# Patient Record
Sex: MALE | Race: BLACK OR AFRICAN AMERICAN | NOT HISPANIC OR LATINO | Employment: OTHER | URBAN - METROPOLITAN AREA
[De-identification: names, ages, dates, MRNs, and addresses within clinical notes are randomized per-mention and may not be internally consistent; named-entity substitution may affect disease eponyms.]

---

## 2017-02-27 NOTE — PATIENT DISCUSSION
PROLIFERATIVE DIABETIC RETINOPATHY WITH DENSE VITREOUS HEMORRHAGE, OD:  AVASTIN (1.25MG) INTRAVITREAL INJECTION. RETURN AS SCHEDULED.

## 2017-02-27 NOTE — PATIENT DISCUSSION
PROLIFERATIVE DIABETIC RETINOPATHY OS: NO TREATMENT IS NEEDED TODAY. WILL CONTINUE TO FOLLOW WITH DILATED EXAMS AS SCHEDULED.

## 2017-03-23 NOTE — PATIENT DISCUSSION
PROLIFERATIVE DIABETIC RETINOPATHY WITH DENSE VITREOUS HEMORRHAGE, OD: BEGINNING TO CLEAR. MONITOR CLOSELY FOR CHANGE WITH DILATED EXAMS.

## 2017-04-06 NOTE — PATIENT DISCUSSION
POST OP OD: PT DOING WELL TODAY. RETINA IS ATTACHED. IOP IS WITHIN ACCEPTABLE LIMITS. VISION SHOULD START TO IMPROVE. IF ANY WORSENING OF VISION OR PAIN, PT IS TO CALL IMMEDIATELY. RETINAL DETACHMENT AND ENDOPHTHALMITIS PRECAUTIONS REVIEWED. INSTRUCTED PATIENT TO WEAR EYE SHIELD WHEN SLEEPING FOR THE FIRST WEEK AFTER SURGERY.

## 2017-06-14 NOTE — PATIENT DISCUSSION
PROLIFERATIVE DIABETIC RETINOPATHY OU:  NO TREATMENT IS NEEDED TODAY. WILL CONTINUE TO FOLLOW WITH DILATED EXAMS AS SCHEDULED.

## 2019-02-11 ENCOUNTER — IMPORTED ENCOUNTER (OUTPATIENT)
Dept: URBAN - METROPOLITAN AREA CLINIC 9 | Facility: CLINIC | Age: 69
End: 2019-02-11

## 2019-08-12 ENCOUNTER — IMPORTED ENCOUNTER (OUTPATIENT)
Dept: URBAN - METROPOLITAN AREA CLINIC 9 | Facility: CLINIC | Age: 69
End: 2019-08-12

## 2020-05-12 ENCOUNTER — IMPORTED ENCOUNTER (OUTPATIENT)
Dept: URBAN - METROPOLITAN AREA CLINIC 9 | Facility: CLINIC | Age: 70
End: 2020-05-12

## 2020-09-09 ENCOUNTER — IMPORTED ENCOUNTER (OUTPATIENT)
Dept: URBAN - METROPOLITAN AREA CLINIC 9 | Facility: CLINIC | Age: 70
End: 2020-09-09

## 2020-09-14 ENCOUNTER — IMPORTED ENCOUNTER (OUTPATIENT)
Dept: URBAN - METROPOLITAN AREA CLINIC 9 | Facility: CLINIC | Age: 70
End: 2020-09-14

## 2020-09-14 PROBLEM — H25.13: Noted: 2020-09-14

## 2020-09-14 PROBLEM — H25.12: Noted: 2020-09-14

## 2021-10-16 ASSESSMENT — VISUAL ACUITY
OS_SC: 20/20 -3 SN
OS_SC: 20/20 SN
OD_PH: 20/30 -2 SN
OD_CC: 20/30 +2 SN
OS_CC: 20/20 -2 SN
OD_SC: 20/30 +2 SN
OD_SC: 20/25 - SN
OD_SC: 20/25 -2 SN
OS_SC: 20/25 - SN
OS_CC: 20/20 - SN
OD_CC: 20/40 SN

## 2021-10-16 ASSESSMENT — TONOMETRY
OS_IOP_MMHG: 19
OD_IOP_MMHG: 17
OS_IOP_MMHG: 19
OS_IOP_MMHG: 16
OD_IOP_MMHG: 17
OD_IOP_MMHG: 15
OS_IOP_MMHG: 16
OD_IOP_MMHG: 14

## 2021-11-03 ENCOUNTER — PREPPED CHART (OUTPATIENT)
Dept: URBAN - METROPOLITAN AREA CLINIC 6 | Facility: CLINIC | Age: 71
End: 2021-11-03

## 2021-11-04 ENCOUNTER — ESTABLISHED COMPREHENSIVE EXAM (OUTPATIENT)
Dept: URBAN - METROPOLITAN AREA CLINIC 6 | Facility: CLINIC | Age: 71
End: 2021-11-04

## 2021-11-04 ENCOUNTER — PREPPED CHART (OUTPATIENT)
Dept: URBAN - METROPOLITAN AREA CLINIC 6 | Facility: CLINIC | Age: 71
End: 2021-11-04

## 2021-11-04 DIAGNOSIS — E11.9: ICD-10-CM

## 2021-11-04 DIAGNOSIS — H40.1132: ICD-10-CM

## 2021-11-04 DIAGNOSIS — H25.13: ICD-10-CM

## 2021-11-04 PROCEDURE — 92014 COMPRE OPH EXAM EST PT 1/>: CPT

## 2021-11-04 PROCEDURE — 92133 CPTRZD OPH DX IMG PST SGM ON: CPT

## 2021-11-04 ASSESSMENT — VISUAL ACUITY
OU_CC: J2
OD_CC: 20/40-2
OS_CC: 20/40-2

## 2021-11-05 ASSESSMENT — TONOMETRY
OD_IOP_MMHG: 18
OS_IOP_MMHG: 21

## 2021-12-20 ENCOUNTER — ESTABLISHED PATIENT (OUTPATIENT)
Dept: URBAN - METROPOLITAN AREA CLINIC 6 | Facility: CLINIC | Age: 71
End: 2021-12-20

## 2021-12-20 DIAGNOSIS — H40.1132: ICD-10-CM

## 2021-12-20 DIAGNOSIS — H25.13: ICD-10-CM

## 2021-12-20 PROCEDURE — 92015 DETERMINE REFRACTIVE STATE: CPT

## 2021-12-20 PROCEDURE — 92012 INTRM OPH EXAM EST PATIENT: CPT

## 2021-12-20 ASSESSMENT — VISUAL ACUITY
OD_CC: 20/40+1
OS_CC: 20/25-1

## 2021-12-20 ASSESSMENT — TONOMETRY
OD_IOP_MMHG: 15
OS_IOP_MMHG: 20

## 2022-02-23 ENCOUNTER — DIAGNOSTICS ONLY (OUTPATIENT)
Dept: URBAN - METROPOLITAN AREA CLINIC 6 | Facility: CLINIC | Age: 72
End: 2022-02-23

## 2022-02-23 DIAGNOSIS — H40.1132: ICD-10-CM

## 2022-02-23 PROCEDURE — 92083 EXTENDED VISUAL FIELD XM: CPT

## 2022-04-21 ENCOUNTER — FOLLOW UP (OUTPATIENT)
Dept: URBAN - METROPOLITAN AREA CLINIC 6 | Facility: CLINIC | Age: 72
End: 2022-04-21

## 2022-04-21 DIAGNOSIS — H40.1132: ICD-10-CM

## 2022-04-21 DIAGNOSIS — H25.13: ICD-10-CM

## 2022-04-21 DIAGNOSIS — Z98.890: ICD-10-CM

## 2022-04-21 PROCEDURE — 92012 INTRM OPH EXAM EST PATIENT: CPT

## 2022-04-21 ASSESSMENT — VISUAL ACUITY
OU_SC: 20/20-2
OS_SC: 20/20-2
OD_SC: 20/40+2
OU_SC: J4

## 2022-04-21 ASSESSMENT — TONOMETRY
OD_IOP_MMHG: 20
OS_IOP_MMHG: 19

## 2022-06-20 RX ORDER — BIMATOPROST 0.01 %
DROPS OPHTHALMIC (EYE)
COMMUNITY

## 2022-06-20 RX ORDER — AZITHROMYCIN 250 MG/1
TABLET, FILM COATED ORAL
COMMUNITY
End: 2022-07-19 | Stop reason: ALTCHOICE

## 2022-06-20 RX ORDER — CHLORTHALIDONE 25 MG/1
TABLET ORAL
COMMUNITY

## 2022-06-20 RX ORDER — BRIMONIDINE TARTRATE, TIMOLOL MALEATE 2; 5 MG/ML; MG/ML
SOLUTION/ DROPS OPHTHALMIC
COMMUNITY
End: 2022-07-19 | Stop reason: ALTCHOICE

## 2022-06-20 RX ORDER — ALBUTEROL SULFATE 90 UG/1
AEROSOL, METERED RESPIRATORY (INHALATION)
COMMUNITY
End: 2022-09-28 | Stop reason: CLARIF

## 2022-06-20 RX ORDER — DILTIAZEM HYDROCHLORIDE 240 MG/1
1 CAPSULE, EXTENDED RELEASE ORAL
COMMUNITY
End: 2022-07-19 | Stop reason: ALTCHOICE

## 2022-06-20 RX ORDER — CLONIDINE HYDROCHLORIDE 0.1 MG/1
0.3 TABLET ORAL 2 TIMES DAILY
COMMUNITY

## 2022-06-28 RX ORDER — OXCARBAZEPINE 600 MG/1
TABLET, FILM COATED ORAL
COMMUNITY

## 2022-06-28 RX ORDER — SILDENAFIL 100 MG/1
TABLET, FILM COATED ORAL
COMMUNITY

## 2022-06-28 RX ORDER — TRAVOPROST OPHTHALMIC SOLUTION 0.04 MG/ML
SOLUTION OPHTHALMIC
COMMUNITY
End: 2022-07-19 | Stop reason: ALTCHOICE

## 2022-06-28 RX ORDER — DIVALPROEX SODIUM 500 MG/1
TABLET, EXTENDED RELEASE ORAL
COMMUNITY

## 2022-06-28 RX ORDER — TADALAFIL 20 MG/1
TABLET ORAL
COMMUNITY
End: 2022-07-19 | Stop reason: ALTCHOICE

## 2022-06-28 RX ORDER — DONEPEZIL HYDROCHLORIDE 5 MG/1
TABLET, FILM COATED ORAL
COMMUNITY

## 2022-06-28 RX ORDER — VALSARTAN 320 MG/1
TABLET ORAL
COMMUNITY

## 2022-06-28 RX ORDER — SITAGLIPTIN AND METFORMIN HYDROCHLORIDE 500; 50 MG/1; MG/1
TABLET, FILM COATED ORAL
COMMUNITY

## 2022-07-19 PROBLEM — M25.519 SHOULDER JOINT PAIN: Status: ACTIVE | Noted: 2022-07-19

## 2022-07-19 PROBLEM — M75.21 RIGHT BICIPITAL TENOSYNOVITIS: Status: ACTIVE | Noted: 2022-07-19

## 2022-07-19 PROBLEM — R63.4 WEIGHT LOSS: Status: ACTIVE | Noted: 2022-07-19

## 2022-07-19 PROBLEM — E83.52 HYPERCALCEMIA: Status: ACTIVE | Noted: 2022-07-19

## 2022-07-19 PROBLEM — E78.2 MIXED HYPERLIPIDEMIA: Status: ACTIVE | Noted: 2022-07-19

## 2022-07-19 PROBLEM — E55.9 VITAMIN D DEFICIENCY: Status: ACTIVE | Noted: 2022-07-19

## 2022-07-19 PROBLEM — J40 BRONCHITIS: Status: ACTIVE | Noted: 2022-07-19

## 2022-07-19 PROBLEM — R35.1 NOCTURIA: Status: ACTIVE | Noted: 2022-07-19

## 2022-07-19 PROBLEM — R61 NIGHT SWEATS: Status: ACTIVE | Noted: 2022-07-19

## 2022-07-19 PROBLEM — J18.9 WALKING PNEUMONIA: Status: ACTIVE | Noted: 2022-07-19

## 2022-07-19 PROBLEM — M81.0 OSTEOPOROSIS: Status: ACTIVE | Noted: 2022-07-19

## 2022-07-19 PROBLEM — R07.81 RIB PAIN ON LEFT SIDE: Status: ACTIVE | Noted: 2022-07-19

## 2022-07-19 PROBLEM — M67.911 DYSFUNCTION OF RIGHT ROTATOR CUFF: Status: ACTIVE | Noted: 2022-07-19

## 2022-07-19 PROBLEM — I10 ESSENTIAL HYPERTENSION: Status: ACTIVE | Noted: 2022-07-19

## 2022-07-19 PROBLEM — N40.1 BENIGN PROSTATIC HYPERPLASIA WITH LOWER URINARY TRACT SYMPTOMS: Status: ACTIVE | Noted: 2022-07-19

## 2022-07-19 PROBLEM — M25.559 HIP PAIN: Status: ACTIVE | Noted: 2022-07-19

## 2022-07-19 PROBLEM — N52.9 ERECTILE DYSFUNCTION: Status: ACTIVE | Noted: 2022-07-19

## 2022-07-19 PROBLEM — M54.2 CERVICALGIA: Status: ACTIVE | Noted: 2022-07-19

## 2022-07-19 PROBLEM — J32.9 SINUSITIS: Status: ACTIVE | Noted: 2022-07-19

## 2022-07-19 PROBLEM — G40.909 SEIZURE DISORDER (HCC): Status: ACTIVE | Noted: 2022-07-19

## 2022-07-19 PROBLEM — R97.20 ELEVATED PSA: Status: ACTIVE | Noted: 2022-07-19

## 2022-07-19 PROBLEM — E11.9 TYPE 2 DIABETES MELLITUS (HCC): Status: ACTIVE | Noted: 2022-07-19

## 2022-07-19 PROBLEM — M54.9 BACKACHE: Status: ACTIVE | Noted: 2022-07-19

## 2022-07-19 PROBLEM — R06.02 SHORTNESS OF BREATH: Status: ACTIVE | Noted: 2022-07-19

## 2022-07-19 PROBLEM — M19.011 ARTHROSIS OF RIGHT ACROMIOCLAVICULAR JOINT: Status: ACTIVE | Noted: 2022-07-19

## 2022-07-19 PROBLEM — N40.0 BPH (BENIGN PROSTATIC HYPERPLASIA): Status: ACTIVE | Noted: 2022-07-19

## 2022-07-19 PROBLEM — C61 PROSTATE CANCER (HCC): Status: ACTIVE | Noted: 2022-07-19

## 2022-07-19 PROBLEM — R07.89 ATYPICAL CHEST PAIN: Status: ACTIVE | Noted: 2022-07-19

## 2022-07-19 PROBLEM — R93.89 ABNORMAL CT SCAN: Status: ACTIVE | Noted: 2022-07-19

## 2022-08-18 PROBLEM — J32.9 SINUSITIS: Status: RESOLVED | Noted: 2022-07-19 | Resolved: 2022-08-18

## 2022-08-22 ENCOUNTER — ESTABLISHED PATIENT (OUTPATIENT)
Dept: URBAN - METROPOLITAN AREA CLINIC 6 | Facility: CLINIC | Age: 72
End: 2022-08-22

## 2022-08-22 DIAGNOSIS — H25.13: ICD-10-CM

## 2022-08-22 DIAGNOSIS — H40.1132: ICD-10-CM

## 2022-08-22 DIAGNOSIS — Z98.890: ICD-10-CM

## 2022-08-22 PROCEDURE — 92020 GONIOSCOPY: CPT

## 2022-08-22 PROCEDURE — 92012 INTRM OPH EXAM EST PATIENT: CPT

## 2022-08-22 ASSESSMENT — VISUAL ACUITY
OD_SC: 20/40+2
OU_SC: 20/25
OS_SC: 20/25+1

## 2022-08-22 ASSESSMENT — TONOMETRY
OD_IOP_MMHG: 20
OS_IOP_MMHG: 29
OD_IOP_MMHG: 25
OS_IOP_MMHG: 28

## 2022-09-16 PROBLEM — C64.1 RENAL CELL CARCINOMA OF RIGHT KIDNEY (HCC): Status: ACTIVE | Noted: 2022-09-16

## 2022-09-16 PROBLEM — D35.02 BILATERAL ADRENAL ADENOMAS: Status: ACTIVE | Noted: 2022-09-16

## 2022-09-16 PROBLEM — D35.01 BILATERAL ADRENAL ADENOMAS: Status: ACTIVE | Noted: 2022-09-16

## 2022-09-19 PROBLEM — N28.89 RIGHT RENAL MASS: Status: ACTIVE | Noted: 2022-09-19

## 2022-09-28 PROBLEM — J18.9 WALKING PNEUMONIA: Status: RESOLVED | Noted: 2022-07-19 | Resolved: 2022-09-28

## 2022-12-23 ENCOUNTER — FOLLOW UP (OUTPATIENT)
Dept: URBAN - METROPOLITAN AREA CLINIC 9 | Facility: CLINIC | Age: 72
End: 2022-12-23

## 2022-12-23 PROCEDURE — 65855 TRABECULOPLASTY LASER SURG: CPT

## 2022-12-23 ASSESSMENT — TONOMETRY
OD_IOP_MMHG: 22
OS_IOP_MMHG: 26

## 2022-12-23 ASSESSMENT — VISUAL ACUITY
OS_SC: 20/25
OD_SC: 20/40-2

## 2023-01-20 ENCOUNTER — FOLLOW UP (OUTPATIENT)
Dept: URBAN - METROPOLITAN AREA CLINIC 9 | Facility: CLINIC | Age: 73
End: 2023-01-20

## 2023-01-20 DIAGNOSIS — H40.1132: ICD-10-CM

## 2023-01-20 PROCEDURE — 65855 TRABECULOPLASTY LASER SURG: CPT

## 2023-01-20 ASSESSMENT — VISUAL ACUITY
OS_SC: 20/25-1
OD_SC: 20/30-1

## 2023-01-20 ASSESSMENT — TONOMETRY
OS_IOP_MMHG: 22
OD_IOP_MMHG: 20

## 2023-04-04 ENCOUNTER — DIAGNOSTICS ONLY (OUTPATIENT)
Dept: URBAN - METROPOLITAN AREA CLINIC 6 | Facility: CLINIC | Age: 73
End: 2023-04-04

## 2023-04-04 DIAGNOSIS — H40.1132: ICD-10-CM

## 2023-04-04 PROCEDURE — 92083 EXTENDED VISUAL FIELD XM: CPT

## 2023-06-26 ENCOUNTER — ESTABLISHED PATIENT (OUTPATIENT)
Dept: URBAN - METROPOLITAN AREA CLINIC 6 | Facility: CLINIC | Age: 73
End: 2023-06-26

## 2023-06-26 DIAGNOSIS — H25.13: ICD-10-CM

## 2023-06-26 DIAGNOSIS — E11.9: ICD-10-CM

## 2023-06-26 DIAGNOSIS — H43.811: ICD-10-CM

## 2023-06-26 DIAGNOSIS — H40.1132: ICD-10-CM

## 2023-06-26 PROCEDURE — 92015 DETERMINE REFRACTIVE STATE: CPT

## 2023-06-26 PROCEDURE — 92133 CPTRZD OPH DX IMG PST SGM ON: CPT

## 2023-06-26 PROCEDURE — 92014 COMPRE OPH EXAM EST PT 1/>: CPT

## 2023-06-26 ASSESSMENT — TONOMETRY
OD_IOP_MMHG: 11
OS_IOP_MMHG: 20

## 2023-06-26 ASSESSMENT — VISUAL ACUITY
OU_CC: 20/25
OD_CC: 20/40-2
OU_CC: J1+
OS_CC: 20/25

## 2023-10-23 ENCOUNTER — ESTABLISHED PATIENT (OUTPATIENT)
Dept: URBAN - METROPOLITAN AREA CLINIC 10 | Facility: CLINIC | Age: 73
End: 2023-10-23

## 2023-10-23 DIAGNOSIS — H25.13: ICD-10-CM

## 2023-10-23 DIAGNOSIS — H40.1132: ICD-10-CM

## 2023-10-23 PROCEDURE — 99213 OFFICE O/P EST LOW 20 MIN: CPT

## 2023-10-23 ASSESSMENT — TONOMETRY
OS_IOP_MMHG: 20
OD_IOP_MMHG: 17

## 2023-10-23 ASSESSMENT — VISUAL ACUITY
OD_SC: 20/50
OS_SC: 20/30-2

## 2024-02-05 ENCOUNTER — ESTABLISHED PATIENT (OUTPATIENT)
Dept: URBAN - METROPOLITAN AREA CLINIC 10 | Facility: CLINIC | Age: 74
End: 2024-02-05

## 2024-02-05 DIAGNOSIS — H40.1132: ICD-10-CM

## 2024-02-05 DIAGNOSIS — H25.13: ICD-10-CM

## 2024-02-05 PROCEDURE — 99213 OFFICE O/P EST LOW 20 MIN: CPT

## 2024-02-05 ASSESSMENT — VISUAL ACUITY
OD_SC: 20/50+2
OS_SC: 20/30

## 2024-02-05 ASSESSMENT — TONOMETRY
OS_IOP_MMHG: 18
OD_IOP_MMHG: 14

## 2024-05-01 ENCOUNTER — TECH ONLY (OUTPATIENT)
Dept: URBAN - METROPOLITAN AREA CLINIC 10 | Facility: CLINIC | Age: 74
End: 2024-05-01

## 2024-05-01 DIAGNOSIS — H40.1132: ICD-10-CM

## 2024-05-01 PROCEDURE — 92083 EXTENDED VISUAL FIELD XM: CPT

## 2024-06-03 ENCOUNTER — ESTABLISHED PATIENT (OUTPATIENT)
Dept: URBAN - METROPOLITAN AREA CLINIC 10 | Facility: CLINIC | Age: 74
End: 2024-06-03

## 2024-06-03 DIAGNOSIS — H25.13: ICD-10-CM

## 2024-06-03 DIAGNOSIS — H43.811: ICD-10-CM

## 2024-06-03 DIAGNOSIS — H40.1132: ICD-10-CM

## 2024-06-03 DIAGNOSIS — E11.9: ICD-10-CM

## 2024-06-03 PROCEDURE — 92133 CPTRZD OPH DX IMG PST SGM ON: CPT

## 2024-06-03 PROCEDURE — 99214 OFFICE O/P EST MOD 30 MIN: CPT

## 2024-06-03 ASSESSMENT — VISUAL ACUITY
OD_CC: 20/40-1
OS_CC: 20/30
OU_CC: J1+

## 2024-06-03 ASSESSMENT — TONOMETRY
OD_IOP_MMHG: 12
OS_IOP_MMHG: 17

## 2024-10-07 ENCOUNTER — FOLLOW UP (OUTPATIENT)
Dept: URBAN - METROPOLITAN AREA CLINIC 10 | Facility: CLINIC | Age: 74
End: 2024-10-07

## 2024-10-07 DIAGNOSIS — H25.13: ICD-10-CM

## 2024-10-07 DIAGNOSIS — H43.811: ICD-10-CM

## 2024-10-07 DIAGNOSIS — H40.1132: ICD-10-CM

## 2024-10-07 DIAGNOSIS — E11.9: ICD-10-CM

## 2024-10-07 PROCEDURE — 99213 OFFICE O/P EST LOW 20 MIN: CPT

## 2024-10-31 ENCOUNTER — PRE-OP/H&P (OUTPATIENT)
Dept: URBAN - METROPOLITAN AREA CLINIC 10 | Facility: CLINIC | Age: 74
End: 2024-10-31

## 2024-10-31 VITALS — HEIGHT: 60 IN | SYSTOLIC BLOOD PRESSURE: 174 MMHG | DIASTOLIC BLOOD PRESSURE: 88 MMHG | HEART RATE: 78 BPM

## 2024-10-31 DIAGNOSIS — H43.811: ICD-10-CM

## 2024-10-31 DIAGNOSIS — H25.13: ICD-10-CM

## 2024-10-31 DIAGNOSIS — H40.1132: ICD-10-CM

## 2024-10-31 DIAGNOSIS — E11.9: ICD-10-CM

## 2024-10-31 PROCEDURE — 92134 CPTRZ OPH DX IMG PST SGM RTA: CPT

## 2024-10-31 PROCEDURE — 99214 OFFICE O/P EST MOD 30 MIN: CPT

## 2024-10-31 PROCEDURE — 92136 OPHTHALMIC BIOMETRY: CPT

## 2024-11-20 ENCOUNTER — SURGERY/PROCEDURE (OUTPATIENT)
Dept: URBAN - METROPOLITAN AREA SURGERY 6 | Facility: SURGERY | Age: 74
End: 2024-11-20

## 2024-11-20 DIAGNOSIS — H40.1132: ICD-10-CM

## 2024-11-20 DIAGNOSIS — H25.13: ICD-10-CM

## 2024-11-20 PROCEDURE — 66984 XCAPSL CTRC RMVL W/O ECP: CPT

## 2024-11-20 PROCEDURE — 65820 GONIOTOMY: CPT

## 2024-11-21 ENCOUNTER — POST-OP (OUTPATIENT)
Dept: URBAN - METROPOLITAN AREA CLINIC 10 | Facility: CLINIC | Age: 74
End: 2024-11-21

## 2024-11-21 DIAGNOSIS — H25.12: ICD-10-CM

## 2024-11-21 DIAGNOSIS — E11.9: ICD-10-CM

## 2024-11-21 DIAGNOSIS — Z96.1: ICD-10-CM

## 2024-11-21 DIAGNOSIS — H40.1132: ICD-10-CM

## 2024-11-21 PROCEDURE — 92136 OPHTHALMIC BIOMETRY: CPT | Mod: 26

## 2024-12-04 ENCOUNTER — SURGERY/PROCEDURE (OUTPATIENT)
Age: 74
End: 2024-12-04

## 2024-12-04 DIAGNOSIS — H40.1132: ICD-10-CM

## 2024-12-04 DIAGNOSIS — H25.13: ICD-10-CM

## 2024-12-04 PROCEDURE — 66984 XCAPSL CTRC RMVL W/O ECP: CPT | Mod: 79,LT

## 2024-12-04 PROCEDURE — 65820 GONIOTOMY: CPT | Mod: 79,LT

## 2024-12-05 ENCOUNTER — POST-OP (OUTPATIENT)
Age: 74
End: 2024-12-05

## 2024-12-05 DIAGNOSIS — E11.9: ICD-10-CM

## 2024-12-05 DIAGNOSIS — Z96.1: ICD-10-CM

## 2024-12-05 DIAGNOSIS — H40.1132: ICD-10-CM
